# Patient Record
Sex: MALE | Race: BLACK OR AFRICAN AMERICAN | URBAN - METROPOLITAN AREA
[De-identification: names, ages, dates, MRNs, and addresses within clinical notes are randomized per-mention and may not be internally consistent; named-entity substitution may affect disease eponyms.]

---

## 2020-07-07 ENCOUNTER — OFFICE VISIT (OUTPATIENT)
Dept: URBAN - METROPOLITAN AREA CLINIC 113 | Facility: CLINIC | Age: 70
End: 2020-07-07

## 2020-07-25 ENCOUNTER — TELEPHONE ENCOUNTER (OUTPATIENT)
Dept: URBAN - METROPOLITAN AREA CLINIC 13 | Facility: CLINIC | Age: 70
End: 2020-07-25

## 2020-07-25 RX ORDER — RIFAXIMIN 550 MG/1
TAKE 1 TABLET 3 TIMES DAILY TABLET ORAL
Qty: 30 | Refills: 0 | OUTPATIENT
Start: 2014-02-11 | End: 2014-05-09

## 2020-07-25 RX ORDER — POLYETHYLENE GLYCOL 3350, SODIUM CHLORIDE, SODIUM BICARBONATE AND POTASSIUM CHLORIDE WITH LEMON FLAVOR 420; 11.2; 5.72; 1.48 G/4L; G/4L; G/4L; G/4L
USE AS DIRECTED POWDER, FOR SOLUTION ORAL
Qty: 1 | Refills: 0 | OUTPATIENT
Start: 2012-06-01 | End: 2012-07-06

## 2020-07-25 RX ORDER — PREDNISOLONE ACETATE 10 MG/ML
INSTILL 1 DROP INTO BOTH EYES 2 TIMES DAILY SUSPENSION/ DROPS OPHTHALMIC
Refills: 0 | OUTPATIENT
Start: 2020-01-08 | End: 2020-03-24

## 2020-07-25 RX ORDER — OMEPRAZOLE 20 MG/1
TAKE 1 CAPSULE DAILY CAPSULE, DELAYED RELEASE ORAL
Refills: 4 | OUTPATIENT
Start: 2019-07-26 | End: 2020-01-30

## 2020-07-25 RX ORDER — LEVOTHYROXINE SODIUM 137 UG/1
TAKE 1 TABLET DAILY TABLET ORAL
Refills: 0 | OUTPATIENT
Start: 2009-12-17 | End: 2011-03-14

## 2020-07-25 RX ORDER — POLYETHYLENE GLYCOL 3350, SODIUM CHLORIDE, SODIUM BICARBONATE AND POTASSIUM CHLORIDE WITH LEMON FLAVOR 420; 11.2; 5.72; 1.48 G/4L; G/4L; G/4L; G/4L
TAKE 1/2 GALLON AT 5:00 PM DAY BEFORE PROCEDURE, TAKE SECOND 1/2 OF GALLON 6 HRS PRIOR TO PROCEDURE POWDER, FOR SOLUTION ORAL
Qty: 1 | Refills: 0 | OUTPATIENT
Start: 2019-12-16 | End: 2020-01-30

## 2020-07-25 RX ORDER — DEXLANSOPRAZOLE 60 MG/1
TAKE 1 CAPSULE DAILY CAPSULE, DELAYED RELEASE ORAL
Qty: 30 | Refills: 10 | OUTPATIENT
Start: 2020-03-24 | End: 2020-03-27

## 2020-07-25 RX ORDER — FLUTICASONE PROPIONATE 50 UG/1
USE 1 SPRAY IN EACH NOSTRIL ONCE DAILY SPRAY, METERED NASAL
Qty: 16 | Refills: 0 | OUTPATIENT
Start: 2014-02-21 | End: 2014-11-25

## 2020-07-25 RX ORDER — ZOLPIDEM TARTRATE 5 MG/1
TAKE 1 TABLET AT BEDTIME AS NEEDED FOR SLEEP TABLET, FILM COATED ORAL
Qty: 30 | Refills: 0 | OUTPATIENT
Start: 2013-03-05 | End: 2014-06-02

## 2020-07-25 RX ORDER — FAMOTIDINE 40 MG/1
TAKE 1 TABLET AT BEDTIME TABLET ORAL
Refills: 0 | OUTPATIENT
Start: 2019-10-31 | End: 2020-03-24

## 2020-07-25 RX ORDER — CITALOPRAM 20 MG/1
TAKE 1 TABLET DAILY TABLET ORAL
Qty: 30 | Refills: 2 | OUTPATIENT
Start: 2014-05-09 | End: 2014-11-25

## 2020-07-25 RX ORDER — PANTOPRAZOLE SODIUM 40 MG/1
TAKE 1 TABLET DAILY TABLET, DELAYED RELEASE ORAL
Refills: 0 | OUTPATIENT
Start: 2019-10-31 | End: 2020-03-24

## 2020-07-25 RX ORDER — KETOCONAZOLE 20.5 MG/ML
SHAMPOO, SUSPENSION TOPICAL
Qty: 120 | Refills: 0 | OUTPATIENT
Start: 2014-01-20 | End: 2014-05-09

## 2020-07-26 ENCOUNTER — TELEPHONE ENCOUNTER (OUTPATIENT)
Dept: URBAN - METROPOLITAN AREA CLINIC 13 | Facility: CLINIC | Age: 70
End: 2020-07-26

## 2020-07-26 RX ORDER — RABEPRAZOLE SODIUM 20 MG/1
TAKE 1 TABLET TWICE DAILY TABLET, DELAYED RELEASE ORAL
Qty: 60 | Refills: 3 | Status: ACTIVE | COMMUNITY
Start: 2020-03-27

## 2020-07-26 RX ORDER — ZOLPIDEM TARTRATE 10 MG/1
TABLET, FILM COATED ORAL
Qty: 30 | Refills: 0 | Status: ACTIVE | COMMUNITY
Start: 2014-07-23

## 2020-07-26 RX ORDER — NAPROXEN 500 MG/1
TABLET ORAL
Qty: 20 | Refills: 0 | Status: ACTIVE | COMMUNITY
Start: 2012-09-17

## 2020-07-26 RX ORDER — NEOMYCIN SULFATE, POLYMYXIN B SULFATE AND HYDROCORTISONE 10; 3.5; 1 MG/ML; MG/ML; [USP'U]/ML
SUSPENSION/ DROPS AURICULAR (OTIC)
Qty: 7 | Refills: 0 | Status: ACTIVE | COMMUNITY
Start: 2014-09-20

## 2020-07-26 RX ORDER — NAPROXEN 500 MG/1
TABLET ORAL
Qty: 60 | Refills: 0 | Status: ACTIVE | COMMUNITY
Start: 2011-12-30

## 2020-07-26 RX ORDER — SULFAMETHOXAZOLE AND TRIMETHOPRIM 800; 160 MG/1; MG/1
TABLET ORAL
Qty: 10 | Refills: 0 | Status: ACTIVE | COMMUNITY
Start: 2019-04-26

## 2020-07-26 RX ORDER — PANTOPRAZOLE SODIUM 40 MG/1
TABLET, DELAYED RELEASE ORAL
Qty: 90 | Refills: 0 | Status: ACTIVE | COMMUNITY
Start: 2019-07-22

## 2020-07-26 RX ORDER — ERYTHROMYCIN 5 MG/G
OINTMENT OPHTHALMIC
Qty: 3 | Refills: 0 | Status: ACTIVE | COMMUNITY
Start: 2014-09-02

## 2020-07-26 RX ORDER — CARISOPRODOL 350 MG/1
TABLET ORAL
Qty: 30 | Refills: 0 | Status: ACTIVE | COMMUNITY
Start: 2012-09-24

## 2020-07-26 RX ORDER — ZOLPIDEM TARTRATE 5 MG/1
TABLET, FILM COATED ORAL
Qty: 30 | Refills: 0 | Status: ACTIVE | COMMUNITY
Start: 2013-03-05

## 2020-07-26 RX ORDER — MELOXICAM 15 MG/1
TABLET ORAL
Qty: 30 | Refills: 0 | Status: ACTIVE | COMMUNITY
Start: 2012-03-27

## 2020-07-26 RX ORDER — FAMOTIDINE 40 MG/1
TABLET ORAL
Qty: 30 | Refills: 0 | Status: ACTIVE | COMMUNITY
Start: 2019-10-31

## 2020-07-26 RX ORDER — TOBRAMYCIN 3 MG/ML
SOLUTION/ DROPS OPHTHALMIC
Qty: 5 | Refills: 0 | Status: ACTIVE | COMMUNITY
Start: 2013-07-29

## 2020-07-26 RX ORDER — ONDANSETRON HYDROCHLORIDE 4 MG/1
TABLET, FILM COATED ORAL
Qty: 15 | Refills: 0 | Status: ACTIVE | COMMUNITY
Start: 2020-06-22

## 2020-07-26 RX ORDER — CITALOPRAM 20 MG/1
TABLET ORAL
Qty: 15 | Refills: 0 | Status: ACTIVE | COMMUNITY
Start: 2012-03-05

## 2020-07-26 RX ORDER — ASPIRIN 81 MG
TABLET, DELAYED RELEASE (ENTERIC COATED) ORAL
Qty: 30 | Refills: 0 | Status: ACTIVE | COMMUNITY
Start: 2014-02-14

## 2020-07-26 RX ORDER — LEVOTHYROXINE SODIUM 125 UG/1
TAKE 1 TABLET DAILY TABLET ORAL
Refills: 0 | Status: ACTIVE | COMMUNITY
Start: 2011-03-14

## 2020-07-26 RX ORDER — ECONAZOLE NITRATE 10 MG/G
APPLY AND GENTLY MASSAGE INTO AFFECTED AREA(S) TWICE DAILY CREAM TOPICAL
Refills: 0 | Status: ACTIVE | COMMUNITY
Start: 2014-01-20

## 2020-07-26 RX ORDER — SILDENAFIL 100 MG/1
TAKE 1 TABLET DAILY 1 HOUR BEFORE NEEDED TABLET, FILM COATED ORAL
Refills: 0 | Status: ACTIVE | COMMUNITY
Start: 2019-07-15

## 2020-07-26 RX ORDER — BENZONATATE 100 MG/1
CAPSULE ORAL
Qty: 30 | Refills: 0 | Status: ACTIVE | COMMUNITY
Start: 2011-12-05

## 2020-07-26 RX ORDER — SODIUM SULFACETAMIDE 100 MG/ML
LOTION TOPICAL
Qty: 118 | Refills: 0 | Status: ACTIVE | COMMUNITY
Start: 2019-05-24

## 2020-07-26 RX ORDER — DICLOFENAC SODIUM 75 MG/1
TABLET, DELAYED RELEASE ORAL
Qty: 60 | Refills: 0 | Status: ACTIVE | COMMUNITY
Start: 2011-04-25

## 2020-07-26 RX ORDER — PREDNISONE 5 MG/1
TABLET ORAL
Qty: 42 | Refills: 0 | Status: ACTIVE | COMMUNITY
Start: 2011-05-18

## 2020-07-26 RX ORDER — SERTRALINE HYDROCHLORIDE 112 UG/1
TABLET ORAL
Qty: 90 | Refills: 0 | Status: ACTIVE | COMMUNITY
Start: 2018-09-17

## 2020-07-26 RX ORDER — METHYLPREDNISOLONE 4 MG/1
TAKE BY MOUTH AS DIRECTED ON INSIDE OF PACKAGE TABLET ORAL
Qty: 21 | Refills: 0 | Status: ACTIVE | COMMUNITY
Start: 2020-05-12

## 2020-07-26 RX ORDER — DICLOFENAC SODIUM 10 MG/G
GEL TOPICAL
Qty: 100 | Refills: 0 | Status: ACTIVE | COMMUNITY
Start: 2019-11-07

## 2020-07-26 RX ORDER — FLUOCINOLONE ACETONIDE OIL 0.11 MG/ML
OIL AURICULAR (OTIC)
Qty: 20 | Refills: 0 | Status: ACTIVE | COMMUNITY
Start: 2014-09-04

## 2020-07-26 RX ORDER — CARISOPRODOL 350 MG/1
TABLET ORAL
Qty: 30 | Refills: 0 | Status: ACTIVE | COMMUNITY
Start: 2019-03-13

## 2020-07-26 RX ORDER — ZOLPIDEM TARTRATE 10 MG/1
TABLET, FILM COATED ORAL
Qty: 30 | Refills: 0 | Status: ACTIVE | COMMUNITY
Start: 2014-05-02

## 2020-07-26 RX ORDER — TESTOSTERONE 16.2 MG/G
GEL TRANSDERMAL
Qty: 75 | Refills: 0 | Status: ACTIVE | COMMUNITY
Start: 2013-08-20

## 2020-07-26 RX ORDER — CLOBETASOL PROPIONATE 0.5 MG/G
AEROSOL, FOAM TOPICAL
Qty: 50 | Refills: 0 | Status: ACTIVE | COMMUNITY
Start: 2014-05-28

## 2020-07-26 RX ORDER — ZALEPLON 10 MG/1
CAPSULE ORAL
Qty: 45 | Refills: 0 | Status: ACTIVE | COMMUNITY
Start: 2012-03-05

## 2020-07-26 RX ORDER — AMOXICILLIN 500 MG/1
CAPSULE ORAL
Qty: 30 | Refills: 0 | Status: ACTIVE | COMMUNITY
Start: 2020-05-12

## 2020-07-26 RX ORDER — HYDROXYZINE PAMOATE 25 MG/1
CAPSULE ORAL
Qty: 30 | Refills: 0 | Status: ACTIVE | COMMUNITY
Start: 2013-12-05

## 2020-07-26 RX ORDER — TRIAMCINOLONE ACETONIDE 0.25 MG/ML
LOTION TOPICAL
Qty: 60 | Refills: 0 | Status: ACTIVE | COMMUNITY
Start: 2014-09-04

## 2020-07-26 RX ORDER — BENZONATATE 100 MG/1
CAPSULE ORAL
Qty: 30 | Refills: 0 | Status: ACTIVE | COMMUNITY
Start: 2018-12-10

## 2020-07-26 RX ORDER — PANTOPRAZOLE SODIUM 40 MG/1
TABLET, DELAYED RELEASE ORAL
Qty: 30 | Refills: 0 | Status: ACTIVE | COMMUNITY
Start: 2019-10-31

## 2020-07-26 RX ORDER — HYDROCODONE BITARTRATE AND ACETAMINOPHEN 7.5; 325 MG/1; MG/1
TABLET ORAL
Qty: 25 | Refills: 0 | Status: ACTIVE | COMMUNITY
Start: 2020-06-22

## 2020-07-26 RX ORDER — CELECOXIB 200 MG/1
CAPSULE ORAL
Qty: 30 | Refills: 0 | Status: ACTIVE | COMMUNITY
Start: 2013-06-13

## 2020-07-26 RX ORDER — PREDNISOLONE ACETATE 10 MG/ML
SUSPENSION/ DROPS OPHTHALMIC
Qty: 10 | Refills: 0 | Status: ACTIVE | COMMUNITY
Start: 2020-01-08

## 2020-07-26 RX ORDER — CYCLOBENZAPRINE HYDROCHLORIDE 10 MG/1
TABLET, FILM COATED ORAL
Qty: 60 | Refills: 0 | Status: ACTIVE | COMMUNITY
Start: 2019-01-28

## 2020-07-26 RX ORDER — DICLOFENAC SODIUM 1 %
GEL (GRAM) TOPICAL
Qty: 255 | Refills: 0 | Status: ACTIVE | COMMUNITY
Start: 2011-05-18

## 2020-07-26 RX ORDER — AZITHROMYCIN DIHYDRATE 250 MG/1
TAKE 2 TABLETS BY MOUTH TODAY, THEN TAKE 1 TABLET DAILY FOR 4 DAYS TABLET, FILM COATED ORAL
Qty: 6 | Refills: 0 | Status: ACTIVE | COMMUNITY
Start: 2012-01-29

## 2020-07-26 RX ORDER — BENZONATATE 100 MG/1
CAPSULE ORAL
Qty: 30 | Refills: 0 | Status: ACTIVE | COMMUNITY
Start: 2014-06-02

## 2020-07-26 RX ORDER — DICLOFENAC SODIUM 75 MG/1
TABLET, DELAYED RELEASE ORAL
Qty: 60 | Refills: 0 | Status: ACTIVE | COMMUNITY
Start: 2019-03-13

## 2020-07-26 RX ORDER — HYDROCODONE BITARTRATE AND ACETAMINOPHEN 7.5; 325 MG/1; MG/1
TABLET ORAL
Qty: 50 | Refills: 0 | Status: ACTIVE | COMMUNITY
Start: 2013-06-13

## 2020-07-26 RX ORDER — PREDNISOLONE ACETATE 10 MG/ML
SUSPENSION/ DROPS OPHTHALMIC
Qty: 5 | Refills: 0 | Status: ACTIVE | COMMUNITY
Start: 2018-10-26

## 2020-07-26 RX ORDER — METRONIDAZOLE 500 MG/1
TABLET ORAL
Qty: 30 | Refills: 0 | Status: ACTIVE | COMMUNITY
Start: 2013-12-26

## 2020-07-26 RX ORDER — CIPROFLOXACIN HYDROCHLORIDE 500 MG/1
TAKE 1 TABLET BY MOUTH EVERY 12 HOURS TABLET, FILM COATED ORAL
Qty: 14 | Refills: 0 | Status: ACTIVE | COMMUNITY
Start: 2020-04-16

## 2020-07-26 RX ORDER — MELOXICAM 15 MG/1
TABLET ORAL
Qty: 30 | Refills: 0 | Status: ACTIVE | COMMUNITY
Start: 2013-11-12

## 2020-07-26 RX ORDER — ERYTHROMYCIN 5 MG/G
OINTMENT OPHTHALMIC
Qty: 3 | Refills: 0 | Status: ACTIVE | COMMUNITY
Start: 2018-09-27

## 2020-07-26 RX ORDER — CEFDINIR 300 MG/1
CAPSULE ORAL
Qty: 20 | Refills: 0 | Status: ACTIVE | COMMUNITY
Start: 2011-11-22

## 2020-07-26 RX ORDER — MELOXICAM 15 MG/1
TABLET ORAL
Qty: 30 | Refills: 0 | Status: ACTIVE | COMMUNITY
Start: 2015-02-18

## 2020-07-26 RX ORDER — TOBRAMYCIN AND DEXAMETHASONE 3; 1 MG/ML; MG/ML
SUSPENSION/ DROPS OPHTHALMIC
Qty: 5 | Refills: 0 | Status: ACTIVE | COMMUNITY
Start: 2014-09-04

## 2020-07-26 RX ORDER — TRAZODONE HYDROCHLORIDE 50 MG/1
TAKE 1 TABLET DAILY TABLET ORAL
Qty: 30 | Refills: 1 | Status: ACTIVE | COMMUNITY
Start: 2018-09-11

## 2020-07-26 RX ORDER — ZOLPIDEM TARTRATE 10 MG/1
TABLET, FILM COATED ORAL
Qty: 30 | Refills: 0 | Status: ACTIVE | COMMUNITY
Start: 2012-02-23

## 2020-07-26 RX ORDER — DICLOFENAC SODIUM 10 MG/G
APPLY 2 GRAMS TO AFFECTED AREA(S) 4 TIMES A DAY GEL TOPICAL
Qty: 300 | Refills: 0 | Status: ACTIVE | COMMUNITY
Start: 2020-01-24

## 2020-07-26 RX ORDER — TRAZODONE HYDROCHLORIDE 50 MG/1
TABLET ORAL
Qty: 1 | Refills: 0 | Status: ACTIVE | COMMUNITY
Start: 2019-03-19

## 2020-07-26 RX ORDER — LEVOCETIRIZINE DIHYDROCHLORIDE 5 MG/1
TABLET ORAL
Qty: 30 | Refills: 0 | Status: ACTIVE | COMMUNITY
Start: 2013-12-05

## 2020-07-26 RX ORDER — CIPROFLOXACIN HYDROCHLORIDE 500 MG/1
TABLET, FILM COATED ORAL
Qty: 20 | Refills: 0 | Status: ACTIVE | COMMUNITY
Start: 2013-12-26

## 2020-07-26 RX ORDER — PIROXICAM 20 MG/1
CAPSULE ORAL
Qty: 30 | Refills: 0 | Status: ACTIVE | COMMUNITY
Start: 2014-04-18

## 2020-07-26 RX ORDER — BROMPHENIRAMINE MALEATE, PSEUDOEPHEDRINE HYDROCHLORIDE, 2; 30; 10 MG/5ML; MG/5ML; MG/5ML
SYRUP ORAL
Qty: 120 | Refills: 0 | Status: ACTIVE | COMMUNITY
Start: 2014-12-30

## 2020-07-26 RX ORDER — CLOBETASOL PROPIONATE 0.5 MG/G
AEROSOL, FOAM TOPICAL
Qty: 50 | Refills: 0 | Status: ACTIVE | COMMUNITY
Start: 2014-06-03

## 2020-07-26 RX ORDER — PENICILLIN V POTASSIUM 500 MG/1
TABLET, FILM COATED ORAL
Qty: 20 | Refills: 0 | Status: ACTIVE | COMMUNITY
Start: 2014-12-24

## 2020-07-26 RX ORDER — DOXYCYCLINE HYCLATE 100 MG/1
TABLET ORAL
Qty: 7 | Refills: 0 | Status: ACTIVE | COMMUNITY
Start: 2013-08-08

## 2020-07-26 RX ORDER — TACROLIMUS 1 MG/G
OINTMENT TOPICAL
Qty: 60 | Refills: 0 | Status: ACTIVE | COMMUNITY
Start: 2011-10-31

## 2020-07-26 RX ORDER — LIDOCAINE 50 MG/G
APPLY 1 PATCH FOR 12 HOURS DAILY PATCH TOPICAL
Qty: 30 | Refills: 0 | Status: ACTIVE | COMMUNITY
Start: 2019-06-03

## 2020-07-26 RX ORDER — ESZOPICLONE 3 MG/1
TAKE 1 TABLET AT BEDTIME AS NEEDED FOR SLEEP TABLET, COATED ORAL
Qty: 30 | Refills: 0 | Status: ACTIVE | COMMUNITY
Start: 2013-06-20

## 2020-07-26 RX ORDER — ERYTHROMYCIN 5 MG/G
OINTMENT OPHTHALMIC
Qty: 3 | Refills: 0 | Status: ACTIVE | COMMUNITY
Start: 2013-07-15

## 2020-07-26 RX ORDER — CEPHALEXIN 500 MG/1
CAPSULE ORAL
Qty: 28 | Refills: 0 | Status: ACTIVE | COMMUNITY
Start: 2012-02-10

## 2020-07-26 RX ORDER — PANTOPRAZOLE SODIUM 40 MG/1
TABLET, DELAYED RELEASE ORAL
Qty: 30 | Refills: 0 | Status: ACTIVE | COMMUNITY
Start: 2013-12-26

## 2020-07-26 RX ORDER — TRIAMCINOLONE ACETONIDE 0.25 MG/ML
LOTION TOPICAL
Qty: 60 | Refills: 0 | Status: ACTIVE | COMMUNITY
Start: 2014-09-09

## 2020-07-26 RX ORDER — MELOXICAM 7.5 MG/1
TABLET ORAL
Qty: 60 | Refills: 0 | Status: ACTIVE | COMMUNITY
Start: 2018-09-05

## 2020-07-26 RX ORDER — ATORVASTATIN CALCIUM 20 MG/1
TAKE 1 TABLET DAILY TABLET, FILM COATED ORAL
Refills: 0 | Status: ACTIVE | COMMUNITY
Start: 2018-08-22

## 2020-07-26 RX ORDER — COLCHICINE 0.6 MG/1
TABLET, FILM COATED ORAL
Qty: 60 | Refills: 0 | Status: ACTIVE | COMMUNITY
Start: 2013-11-19

## 2020-07-26 RX ORDER — TOBRAMYCIN AND DEXAMETHASONE 3; 1 MG/G; MG/G
OINTMENT OPHTHALMIC
Qty: 3 | Refills: 0 | Status: ACTIVE | COMMUNITY
Start: 2019-12-03

## 2020-07-26 RX ORDER — TRAMADOL HYDROCHLORIDE 50 MG/1
TABLET ORAL
Qty: 21 | Refills: 0 | Status: ACTIVE | COMMUNITY
Start: 2019-03-13

## 2020-07-26 RX ORDER — AMOXICILLIN AND CLAVULANATE POTASSIUM 875; 125 MG/1; MG/1
TABLET, FILM COATED ORAL
Qty: 14 | Refills: 0 | Status: ACTIVE | COMMUNITY
Start: 2019-12-05

## 2020-07-26 RX ORDER — NAFTIFINE HYDROCHLORIDE 10 MG/G
GEL TOPICAL
Qty: 90 | Refills: 0 | Status: ACTIVE | COMMUNITY
Start: 2012-04-04

## 2020-07-26 RX ORDER — FAMOTIDINE 20 MG/1
TABLET ORAL
Qty: 60 | Refills: 0 | Status: ACTIVE | COMMUNITY
Start: 2014-02-14

## 2020-07-26 RX ORDER — CLOBETASOL PROPIONATE 0.05 G/100ML
SHAMPOO TOPICAL
Qty: 118 | Refills: 0 | Status: ACTIVE | COMMUNITY
Start: 2012-04-04

## 2020-07-26 RX ORDER — LIDOCAINE 50 MG/G
PATCH TOPICAL
Qty: 30 | Refills: 0 | Status: ACTIVE | COMMUNITY
Start: 2018-09-11

## 2020-07-26 RX ORDER — ZOLPIDEM TARTRATE 10 MG/1
TABLET, FILM COATED ORAL
Qty: 30 | Refills: 0 | Status: ACTIVE | COMMUNITY
Start: 2011-03-04

## 2020-12-03 ENCOUNTER — OFFICE VISIT (OUTPATIENT)
Dept: URBAN - METROPOLITAN AREA CLINIC 113 | Facility: CLINIC | Age: 70
End: 2020-12-03
Payer: MEDICARE

## 2020-12-03 ENCOUNTER — WEB ENCOUNTER (OUTPATIENT)
Dept: URBAN - METROPOLITAN AREA CLINIC 113 | Facility: CLINIC | Age: 70
End: 2020-12-03

## 2020-12-03 VITALS
BODY MASS INDEX: 27.89 KG/M2 | TEMPERATURE: 98 F | SYSTOLIC BLOOD PRESSURE: 113 MMHG | HEART RATE: 58 BPM | HEIGHT: 68 IN | WEIGHT: 184 LBS | DIASTOLIC BLOOD PRESSURE: 72 MMHG

## 2020-12-03 DIAGNOSIS — R14.2 ERUCTATION: ICD-10-CM

## 2020-12-03 DIAGNOSIS — K21.9 GASTROESOPHAGEAL REFLUX DISEASE WITHOUT ESOPHAGITIS: ICD-10-CM

## 2020-12-03 DIAGNOSIS — R14.0 BLOATING: ICD-10-CM

## 2020-12-03 PROCEDURE — G8427 DOCREV CUR MEDS BY ELIG CLIN: HCPCS | Performed by: INTERNAL MEDICINE

## 2020-12-03 PROCEDURE — 99213 OFFICE O/P EST LOW 20 MIN: CPT | Performed by: INTERNAL MEDICINE

## 2020-12-03 RX ORDER — PIROXICAM 20 MG/1
CAPSULE ORAL
Qty: 30 | Refills: 0 | Status: ON HOLD | COMMUNITY
Start: 2014-04-18

## 2020-12-03 RX ORDER — CELECOXIB 200 MG/1
CAPSULE ORAL
Qty: 30 | Refills: 0 | Status: ON HOLD | COMMUNITY
Start: 2013-06-13

## 2020-12-03 RX ORDER — METHYLPREDNISOLONE 4 MG/1
TAKE BY MOUTH AS DIRECTED ON INSIDE OF PACKAGE TABLET ORAL
Qty: 21 | Refills: 0 | Status: ON HOLD | COMMUNITY
Start: 2020-05-12

## 2020-12-03 RX ORDER — AZITHROMYCIN DIHYDRATE 250 MG/1
TAKE 2 TABLETS BY MOUTH TODAY, THEN TAKE 1 TABLET DAILY FOR 4 DAYS TABLET, FILM COATED ORAL
Qty: 6 | Refills: 0 | Status: ON HOLD | COMMUNITY
Start: 2012-01-29

## 2020-12-03 RX ORDER — DOXYCYCLINE HYCLATE 100 MG/1
TABLET ORAL
Qty: 7 | Refills: 0 | Status: ON HOLD | COMMUNITY
Start: 2013-08-08

## 2020-12-03 RX ORDER — ESOMEPRAZOLE SODIUM 40 MG/5ML
AS DIRECTED INJECTION INTRAVENOUS
Status: DISCONTINUED | COMMUNITY

## 2020-12-03 RX ORDER — TOBRAMYCIN AND DEXAMETHASONE 3; 1 MG/G; MG/G
OINTMENT OPHTHALMIC
Qty: 3 | Refills: 0 | Status: ON HOLD | COMMUNITY
Start: 2019-12-03

## 2020-12-03 RX ORDER — DICLOFENAC SODIUM 1 %
GEL (GRAM) TOPICAL
Qty: 255 | Refills: 0 | Status: ON HOLD | COMMUNITY
Start: 2011-05-18

## 2020-12-03 RX ORDER — MELOXICAM 7.5 MG/1
TABLET ORAL
Qty: 60 | Refills: 0 | Status: ON HOLD | COMMUNITY
Start: 2018-09-05

## 2020-12-03 RX ORDER — ZALEPLON 10 MG/1
CAPSULE ORAL
Qty: 45 | Refills: 0 | Status: ON HOLD | COMMUNITY
Start: 2012-03-05

## 2020-12-03 RX ORDER — SULFAMETHOXAZOLE AND TRIMETHOPRIM 800; 160 MG/1; MG/1
TABLET ORAL
Qty: 10 | Refills: 0 | Status: ON HOLD | COMMUNITY
Start: 2019-04-26

## 2020-12-03 RX ORDER — METRONIDAZOLE 500 MG/1
TABLET ORAL
Qty: 30 | Refills: 0 | Status: ON HOLD | COMMUNITY
Start: 2013-12-26

## 2020-12-03 RX ORDER — CARISOPRODOL 350 MG/1
TABLET ORAL
Qty: 30 | Refills: 0 | Status: ON HOLD | COMMUNITY
Start: 2012-09-24

## 2020-12-03 RX ORDER — LIDOCAINE 50 MG/G
PATCH TOPICAL
Qty: 30 | Refills: 0 | Status: ACTIVE | COMMUNITY
Start: 2018-09-11

## 2020-12-03 RX ORDER — TESTOSTERONE 16.2 MG/G
GEL TRANSDERMAL
Qty: 75 | Refills: 0 | Status: ON HOLD | COMMUNITY
Start: 2013-08-20

## 2020-12-03 RX ORDER — CLOBETASOL PROPIONATE 0.05 G/100ML
SHAMPOO TOPICAL
Qty: 118 | Refills: 0 | Status: ON HOLD | COMMUNITY
Start: 2012-04-04

## 2020-12-03 RX ORDER — NEOMYCIN SULFATE, POLYMYXIN B SULFATE AND HYDROCORTISONE 10; 3.5; 1 MG/ML; MG/ML; [USP'U]/ML
SUSPENSION/ DROPS AURICULAR (OTIC)
Qty: 7 | Refills: 0 | Status: ON HOLD | COMMUNITY
Start: 2014-09-20

## 2020-12-03 RX ORDER — SILDENAFIL 100 MG/1
TAKE 1 TABLET DAILY 1 HOUR BEFORE NEEDED TABLET, FILM COATED ORAL
Refills: 0 | Status: ACTIVE | COMMUNITY
Start: 2019-07-15

## 2020-12-03 RX ORDER — RABEPRAZOLE SODIUM 20 MG/1
TAKE 1 TABLET TWICE DAILY TABLET, DELAYED RELEASE ORAL
Qty: 60 | Refills: 3 | Status: ON HOLD | COMMUNITY
Start: 2020-03-27

## 2020-12-03 RX ORDER — FLUOCINOLONE ACETONIDE OIL 0.11 MG/ML
OIL AURICULAR (OTIC)
Qty: 20 | Refills: 0 | Status: ON HOLD | COMMUNITY
Start: 2014-09-04

## 2020-12-03 RX ORDER — NAPROXEN 500 MG/1
TABLET ORAL
Qty: 60 | Refills: 0 | Status: ON HOLD | COMMUNITY
Start: 2011-12-30

## 2020-12-03 RX ORDER — TRIAMCINOLONE ACETONIDE 0.25 MG/ML
LOTION TOPICAL
Qty: 60 | Refills: 0 | Status: ON HOLD | COMMUNITY
Start: 2014-09-04

## 2020-12-03 RX ORDER — AMITRIPTYLINE HYDROCHLORIDE 25 MG/1
1 TABLET AT BEDTIME TABLET, FILM COATED ORAL ONCE A DAY
Qty: 30 TABLET | Refills: 4 | OUTPATIENT

## 2020-12-03 RX ORDER — CYCLOBENZAPRINE HYDROCHLORIDE 10 MG/1
TABLET, FILM COATED ORAL
Qty: 60 | Refills: 0 | Status: ON HOLD | COMMUNITY
Start: 2019-01-28

## 2020-12-03 RX ORDER — CIPROFLOXACIN HYDROCHLORIDE 500 MG/1
TABLET, FILM COATED ORAL
Qty: 20 | Refills: 0 | Status: ON HOLD | COMMUNITY
Start: 2013-12-26

## 2020-12-03 RX ORDER — AMOXICILLIN AND CLAVULANATE POTASSIUM 875; 125 MG/1; MG/1
TABLET, FILM COATED ORAL
Qty: 14 | Refills: 0 | Status: ON HOLD | COMMUNITY
Start: 2019-12-05

## 2020-12-03 RX ORDER — AMOXICILLIN 500 MG/1
CAPSULE ORAL
Qty: 30 | Refills: 0 | Status: ON HOLD | COMMUNITY
Start: 2020-05-12

## 2020-12-03 RX ORDER — FAMOTIDINE 40 MG/1
TABLET ORAL
Qty: 30 | Refills: 0 | Status: ACTIVE | COMMUNITY
Start: 2019-10-31

## 2020-12-03 RX ORDER — ASPIRIN 81 MG
TABLET, DELAYED RELEASE (ENTERIC COATED) ORAL
Qty: 30 | Refills: 0 | Status: ON HOLD | COMMUNITY
Start: 2014-02-14

## 2020-12-03 RX ORDER — PANTOPRAZOLE SODIUM 40 MG/1
TABLET, DELAYED RELEASE ORAL
Qty: 30 | Refills: 0 | Status: ON HOLD | COMMUNITY
Start: 2013-12-26

## 2020-12-03 RX ORDER — PENICILLIN V POTASSIUM 500 MG/1
TABLET, FILM COATED ORAL
Qty: 20 | Refills: 0 | Status: ON HOLD | COMMUNITY
Start: 2014-12-24

## 2020-12-03 RX ORDER — MELOXICAM 15 MG/1
TABLET ORAL
Qty: 30 | Refills: 0 | Status: ON HOLD | COMMUNITY
Start: 2012-03-27

## 2020-12-03 RX ORDER — SODIUM SULFACETAMIDE 100 MG/ML
LOTION TOPICAL
Qty: 118 | Refills: 0 | Status: ON HOLD | COMMUNITY
Start: 2019-05-24

## 2020-12-03 RX ORDER — TOBRAMYCIN AND DEXAMETHASONE 3; 1 MG/ML; MG/ML
SUSPENSION/ DROPS OPHTHALMIC
Qty: 5 | Refills: 0 | Status: ON HOLD | COMMUNITY
Start: 2014-09-04

## 2020-12-03 RX ORDER — BROMPHENIRAMINE MALEATE, PSEUDOEPHEDRINE HYDROCHLORIDE, 2; 30; 10 MG/5ML; MG/5ML; MG/5ML
SYRUP ORAL
Qty: 120 | Refills: 0 | Status: ON HOLD | COMMUNITY
Start: 2014-12-30

## 2020-12-03 RX ORDER — ERYTHROMYCIN 5 MG/G
OINTMENT OPHTHALMIC
Qty: 3 | Refills: 0 | Status: ON HOLD | COMMUNITY
Start: 2013-07-15

## 2020-12-03 RX ORDER — ESZOPICLONE 3 MG/1
TAKE 1 TABLET AT BEDTIME AS NEEDED FOR SLEEP TABLET, COATED ORAL
Qty: 30 | Refills: 0 | Status: ON HOLD | COMMUNITY
Start: 2013-06-20

## 2020-12-03 RX ORDER — NAFTIFINE HYDROCHLORIDE 10 MG/G
GEL TOPICAL
Qty: 90 | Refills: 0 | Status: ON HOLD | COMMUNITY
Start: 2012-04-04

## 2020-12-03 RX ORDER — TRAZODONE HYDROCHLORIDE 50 MG/1
TAKE 1 TABLET DAILY TABLET ORAL
Qty: 30 | Refills: 1 | Status: ACTIVE | COMMUNITY
Start: 2018-09-11

## 2020-12-03 RX ORDER — CITALOPRAM 20 MG/1
TABLET ORAL
Qty: 15 | Refills: 0 | Status: ON HOLD | COMMUNITY
Start: 2012-03-05

## 2020-12-03 RX ORDER — DICLOFENAC SODIUM 75 MG/1
TABLET, DELAYED RELEASE ORAL
Qty: 60 | Refills: 0 | Status: ON HOLD | COMMUNITY
Start: 2011-04-25

## 2020-12-03 RX ORDER — LEVOTHYROXINE SODIUM 125 UG/1
TAKE 1 TABLET DAILY TABLET ORAL
Refills: 0 | Status: ACTIVE | COMMUNITY
Start: 2011-03-14

## 2020-12-03 RX ORDER — ATORVASTATIN CALCIUM 20 MG/1
TAKE 1 TABLET DAILY TABLET, FILM COATED ORAL
Refills: 0 | Status: ON HOLD | COMMUNITY
Start: 2018-08-22

## 2020-12-03 RX ORDER — CEFDINIR 300 MG/1
CAPSULE ORAL
Qty: 20 | Refills: 0 | Status: ON HOLD | COMMUNITY
Start: 2011-11-22

## 2020-12-03 RX ORDER — COLCHICINE 0.6 MG/1
TABLET, FILM COATED ORAL
Qty: 60 | Refills: 0 | Status: ON HOLD | COMMUNITY
Start: 2013-11-19

## 2020-12-03 RX ORDER — ROSUVASTATIN CALCIUM 10 MG/1
1 TABLET TABLET, FILM COATED ORAL ONCE A DAY
Status: ACTIVE | COMMUNITY

## 2020-12-03 RX ORDER — BENZONATATE 100 MG/1
CAPSULE ORAL
Qty: 30 | Refills: 0 | Status: ON HOLD | COMMUNITY
Start: 2011-12-05

## 2020-12-03 RX ORDER — FAMOTIDINE 20 MG/1
TABLET ORAL
Qty: 60 | Refills: 0 | Status: ON HOLD | COMMUNITY
Start: 2014-02-14

## 2020-12-03 RX ORDER — LEVOCETIRIZINE DIHYDROCHLORIDE 5 MG/1
TABLET ORAL
Qty: 30 | Refills: 0 | Status: ON HOLD | COMMUNITY
Start: 2013-12-05

## 2020-12-03 RX ORDER — CLOBETASOL PROPIONATE 0.5 MG/G
AEROSOL, FOAM TOPICAL
Qty: 50 | Refills: 0 | Status: ON HOLD | COMMUNITY
Start: 2014-05-28

## 2020-12-03 RX ORDER — PREDNISONE 5 MG/1
TABLET ORAL
Qty: 42 | Refills: 0 | Status: ON HOLD | COMMUNITY
Start: 2011-05-18

## 2020-12-03 RX ORDER — SERTRALINE HYDROCHLORIDE 112 UG/1
TABLET ORAL
Qty: 90 | Refills: 0 | Status: ACTIVE | COMMUNITY
Start: 2018-09-17

## 2020-12-03 RX ORDER — ZOLPIDEM TARTRATE 5 MG/1
TABLET, FILM COATED ORAL
Qty: 30 | Refills: 0 | Status: ON HOLD | COMMUNITY
Start: 2013-03-05

## 2020-12-03 RX ORDER — PREDNISOLONE ACETATE 10 MG/ML
SUSPENSION/ DROPS OPHTHALMIC
Qty: 5 | Refills: 0 | Status: ON HOLD | COMMUNITY
Start: 2018-10-26

## 2020-12-03 RX ORDER — ONDANSETRON HYDROCHLORIDE 4 MG/1
TABLET, FILM COATED ORAL
Qty: 15 | Refills: 0 | Status: ON HOLD | COMMUNITY
Start: 2020-06-22

## 2020-12-03 RX ORDER — HYDROXYZINE PAMOATE 25 MG/1
CAPSULE ORAL
Qty: 30 | Refills: 0 | Status: ON HOLD | COMMUNITY
Start: 2013-12-05

## 2020-12-03 RX ORDER — TRAMADOL HYDROCHLORIDE 50 MG/1
TABLET ORAL
Qty: 21 | Refills: 0 | Status: ACTIVE | COMMUNITY
Start: 2019-03-13

## 2020-12-03 RX ORDER — ZOLPIDEM TARTRATE 10 MG/1
TABLET, FILM COATED ORAL
Qty: 30 | Refills: 0 | Status: ON HOLD | COMMUNITY
Start: 2011-03-04

## 2020-12-03 RX ORDER — HYDROCODONE BITARTRATE AND ACETAMINOPHEN 7.5; 325 MG/1; MG/1
TABLET ORAL
Qty: 50 | Refills: 0 | Status: ON HOLD | COMMUNITY
Start: 2013-06-13

## 2020-12-03 RX ORDER — CEPHALEXIN 500 MG/1
CAPSULE ORAL
Qty: 28 | Refills: 0 | Status: ON HOLD | COMMUNITY
Start: 2012-02-10

## 2020-12-03 RX ORDER — DICLOFENAC SODIUM 10 MG/G
GEL TOPICAL
Qty: 100 | Refills: 0 | Status: ON HOLD | COMMUNITY
Start: 2019-11-07

## 2020-12-03 RX ORDER — TOBRAMYCIN 3 MG/ML
SOLUTION/ DROPS OPHTHALMIC
Qty: 5 | Refills: 0 | Status: ON HOLD | COMMUNITY
Start: 2013-07-29

## 2020-12-03 RX ORDER — ESOMEPRAZOLE MAGNESIUM 40 MG/1
1 CAPSULE CAPSULE, DELAYED RELEASE ORAL ONCE A DAY
Status: ACTIVE | COMMUNITY

## 2020-12-03 RX ORDER — ECONAZOLE NITRATE 10 MG/G
APPLY AND GENTLY MASSAGE INTO AFFECTED AREA(S) TWICE DAILY CREAM TOPICAL
Refills: 0 | Status: ON HOLD | COMMUNITY
Start: 2014-01-20

## 2020-12-03 RX ORDER — TACROLIMUS 1 MG/G
OINTMENT TOPICAL
Qty: 60 | Refills: 0 | Status: ON HOLD | COMMUNITY
Start: 2011-10-31

## 2020-12-03 NOTE — EXAM-PHYSICAL EXAM
He is alert and oriented to person place and situation in no acute distress.  Abdomen is soft nondistended and nontender without masses or organomegaly.

## 2020-12-03 NOTE — HPI-TODAY'S VISIT:
The patient is a very pleasant 71-year-old gentleman who I followed in the past for acid reflux and colonoscopies for screening purposes.  His last colonoscopy was in January 2020.  This showed prostate enlargement but otherwise unremarkable exam.  His last upper endoscopy was in March 2011.  This revealed a medium sized hiatal hernia.  When I last saw the patient he was referred back to to the question of whether his chronic cough was related to acid reflux.  He was on a proton pump inhibitor at that time.  We gave him a trial of Dexilant at that time.  He had a CT scan of the abdomen and pelvis in April.  This was for hematuria.  This showed an enlarged prostate.  He had a hemangioma of the right hepatic lobe at that time. The patient comes in with a new problem today.  He states over the past few weeks he has developed belching and bloating.  He describes this is coming on when he wakes in the morning and continues till he goes to bed at night.  He has no diarrhea or constipation associated with this.  Proton pump inhibitors do not help.  He is made an emergency room visit because of it.  He states his primary care physician repeated his CT scan a few weeks ago due to the symptoms.  We do not have a copy of that result but according to the patient it was unremarkable.  He does feel that he is under a great deal of stress at this time.  His mother who is 91 years old recently moved in with him due to failing health issues.  He states the cough that I was seeing him for last year has largely resolved and has not an issue.  He does not have any heartburn issues.  He does have a great deal of poor sleep.

## 2021-01-27 ENCOUNTER — WEB ENCOUNTER (OUTPATIENT)
Dept: URBAN - METROPOLITAN AREA CLINIC 113 | Facility: CLINIC | Age: 71
End: 2021-01-27

## 2021-01-27 ENCOUNTER — OFFICE VISIT (OUTPATIENT)
Dept: URBAN - METROPOLITAN AREA CLINIC 113 | Facility: CLINIC | Age: 71
End: 2021-01-27
Payer: MEDICARE

## 2021-01-27 ENCOUNTER — LAB OUTSIDE AN ENCOUNTER (OUTPATIENT)
Dept: URBAN - METROPOLITAN AREA CLINIC 113 | Facility: CLINIC | Age: 71
End: 2021-01-27

## 2021-01-27 VITALS
TEMPERATURE: 97.1 F | WEIGHT: 182 LBS | HEART RATE: 53 BPM | HEIGHT: 68 IN | BODY MASS INDEX: 27.58 KG/M2 | DIASTOLIC BLOOD PRESSURE: 69 MMHG | SYSTOLIC BLOOD PRESSURE: 126 MMHG

## 2021-01-27 DIAGNOSIS — K21.9 GASTROESOPHAGEAL REFLUX DISEASE WITHOUT ESOPHAGITIS: ICD-10-CM

## 2021-01-27 DIAGNOSIS — R14.2 ERUCTATION: ICD-10-CM

## 2021-01-27 DIAGNOSIS — R14.0 BLOATING: ICD-10-CM

## 2021-01-27 PROCEDURE — 99214 OFFICE O/P EST MOD 30 MIN: CPT | Performed by: NURSE PRACTITIONER

## 2021-01-27 PROCEDURE — G8427 DOCREV CUR MEDS BY ELIG CLIN: HCPCS | Performed by: NURSE PRACTITIONER

## 2021-01-27 RX ORDER — TESTOSTERONE 16.2 MG/G
GEL TRANSDERMAL
Qty: 75 | Refills: 0 | Status: ON HOLD | COMMUNITY
Start: 2013-08-20

## 2021-01-27 RX ORDER — ASPIRIN 81 MG
TABLET, DELAYED RELEASE (ENTERIC COATED) ORAL
Qty: 30 | Refills: 0 | Status: ON HOLD | COMMUNITY
Start: 2014-02-14

## 2021-01-27 RX ORDER — OMEPRAZOLE 40 MG/1
1 CAPSULE CAPSULE, DELAYED RELEASE ORAL ONCE A DAY
Status: ACTIVE | COMMUNITY

## 2021-01-27 RX ORDER — TOBRAMYCIN AND DEXAMETHASONE 3; 1 MG/G; MG/G
OINTMENT OPHTHALMIC
Qty: 3 | Refills: 0 | Status: ON HOLD | COMMUNITY
Start: 2019-12-03

## 2021-01-27 RX ORDER — AMOXICILLIN 500 MG/1
CAPSULE ORAL
Qty: 30 | Refills: 0 | Status: ON HOLD | COMMUNITY
Start: 2020-05-12

## 2021-01-27 RX ORDER — ESZOPICLONE 3 MG/1
TAKE 1 TABLET AT BEDTIME AS NEEDED FOR SLEEP TABLET, COATED ORAL
Qty: 30 | Refills: 0 | Status: ACTIVE | COMMUNITY
Start: 2013-06-20

## 2021-01-27 RX ORDER — TOBRAMYCIN 3 MG/ML
SOLUTION/ DROPS OPHTHALMIC
Qty: 5 | Refills: 0 | Status: ON HOLD | COMMUNITY
Start: 2013-07-29

## 2021-01-27 RX ORDER — CLOBETASOL PROPIONATE 0.5 MG/G
AEROSOL, FOAM TOPICAL
Qty: 50 | Refills: 0 | Status: ON HOLD | COMMUNITY
Start: 2014-05-28

## 2021-01-27 RX ORDER — LEVOCETIRIZINE DIHYDROCHLORIDE 5 MG/1
TABLET ORAL
Qty: 30 | Refills: 0 | Status: ON HOLD | COMMUNITY
Start: 2013-12-05

## 2021-01-27 RX ORDER — ESOMEPRAZOLE MAGNESIUM 40 MG/1
1 CAPSULE CAPSULE, DELAYED RELEASE ORAL ONCE A DAY
OUTPATIENT

## 2021-01-27 RX ORDER — TACROLIMUS 1 MG/G
OINTMENT TOPICAL
Qty: 60 | Refills: 0 | Status: ON HOLD | COMMUNITY
Start: 2011-10-31

## 2021-01-27 RX ORDER — DICLOFENAC SODIUM 1 %
GEL (GRAM) TOPICAL
Qty: 255 | Refills: 0 | Status: ON HOLD | COMMUNITY
Start: 2011-05-18

## 2021-01-27 RX ORDER — CEPHALEXIN 500 MG/1
CAPSULE ORAL
Qty: 28 | Refills: 0 | Status: ON HOLD | COMMUNITY
Start: 2012-02-10

## 2021-01-27 RX ORDER — SILDENAFIL 100 MG/1
TAKE 1 TABLET DAILY 1 HOUR BEFORE NEEDED TABLET, FILM COATED ORAL
Refills: 0 | Status: ACTIVE | COMMUNITY
Start: 2019-07-15

## 2021-01-27 RX ORDER — PIROXICAM 20 MG/1
CAPSULE ORAL
Qty: 30 | Refills: 0 | Status: ON HOLD | COMMUNITY
Start: 2014-04-18

## 2021-01-27 RX ORDER — SULFAMETHOXAZOLE AND TRIMETHOPRIM 800; 160 MG/1; MG/1
TABLET ORAL
Qty: 10 | Refills: 0 | Status: ON HOLD | COMMUNITY
Start: 2019-04-26

## 2021-01-27 RX ORDER — AZITHROMYCIN DIHYDRATE 250 MG/1
TAKE 2 TABLETS BY MOUTH TODAY, THEN TAKE 1 TABLET DAILY FOR 4 DAYS TABLET, FILM COATED ORAL
Qty: 6 | Refills: 0 | Status: ON HOLD | COMMUNITY
Start: 2012-01-29

## 2021-01-27 RX ORDER — DICLOFENAC SODIUM 75 MG/1
TABLET, DELAYED RELEASE ORAL
Qty: 60 | Refills: 0 | Status: ON HOLD | COMMUNITY
Start: 2011-04-25

## 2021-01-27 RX ORDER — CIPROFLOXACIN HYDROCHLORIDE 500 MG/1
TABLET, FILM COATED ORAL
Qty: 20 | Refills: 0 | Status: ON HOLD | COMMUNITY
Start: 2013-12-26

## 2021-01-27 RX ORDER — MELOXICAM 15 MG/1
TABLET ORAL
Qty: 30 | Refills: 0 | Status: ON HOLD | COMMUNITY
Start: 2012-03-27

## 2021-01-27 RX ORDER — COLCHICINE 0.6 MG/1
TABLET, FILM COATED ORAL
Qty: 60 | Refills: 0 | Status: ON HOLD | COMMUNITY
Start: 2013-11-19

## 2021-01-27 RX ORDER — ZALEPLON 10 MG/1
CAPSULE ORAL
Qty: 45 | Refills: 0 | Status: ON HOLD | COMMUNITY
Start: 2012-03-05

## 2021-01-27 RX ORDER — MELOXICAM 7.5 MG/1
TABLET ORAL
Qty: 60 | Refills: 0 | Status: ON HOLD | COMMUNITY
Start: 2018-09-05

## 2021-01-27 RX ORDER — PREDNISOLONE ACETATE 10 MG/ML
SUSPENSION/ DROPS OPHTHALMIC
Qty: 5 | Refills: 0 | Status: ON HOLD | COMMUNITY
Start: 2018-10-26

## 2021-01-27 RX ORDER — PANTOPRAZOLE SODIUM 40 MG/1
1 TABLET TABLET, DELAYED RELEASE ORAL ONCE A DAY
Qty: 90 TABLET | Refills: 2 | OUTPATIENT
Start: 2021-01-27

## 2021-01-27 RX ORDER — SERTRALINE HYDROCHLORIDE 112 UG/1
TABLET ORAL
Qty: 90 | Refills: 0 | Status: ACTIVE | COMMUNITY
Start: 2018-09-17

## 2021-01-27 RX ORDER — ATORVASTATIN CALCIUM 20 MG/1
TAKE 1 TABLET DAILY TABLET, FILM COATED ORAL
Refills: 0 | Status: ON HOLD | COMMUNITY
Start: 2018-08-22

## 2021-01-27 RX ORDER — LEVOTHYROXINE SODIUM 125 UG/1
TAKE 1 TABLET DAILY TABLET ORAL
Refills: 0 | Status: ON HOLD | COMMUNITY
Start: 2011-03-14

## 2021-01-27 RX ORDER — PREDNISONE 5 MG/1
TABLET ORAL
Qty: 42 | Refills: 0 | Status: ON HOLD | COMMUNITY
Start: 2011-05-18

## 2021-01-27 RX ORDER — NAPROXEN 500 MG/1
TABLET ORAL
Qty: 60 | Refills: 0 | Status: ON HOLD | COMMUNITY
Start: 2011-12-30

## 2021-01-27 RX ORDER — SODIUM SULFACETAMIDE 100 MG/ML
LOTION TOPICAL
Qty: 118 | Refills: 0 | Status: ON HOLD | COMMUNITY
Start: 2019-05-24

## 2021-01-27 RX ORDER — TRAZODONE HYDROCHLORIDE 50 MG/1
TAKE 1 TABLET DAILY TABLET ORAL
Qty: 30 | Refills: 1 | Status: ACTIVE | COMMUNITY
Start: 2018-09-11

## 2021-01-27 RX ORDER — FAMOTIDINE 20 MG/1
TABLET ORAL
Qty: 60 | Refills: 0 | Status: ON HOLD | COMMUNITY
Start: 2014-02-14

## 2021-01-27 RX ORDER — PANTOPRAZOLE SODIUM 40 MG/1
TABLET, DELAYED RELEASE ORAL
Qty: 30 | Refills: 0 | Status: ON HOLD | COMMUNITY
Start: 2013-12-26

## 2021-01-27 RX ORDER — ESOMEPRAZOLE MAGNESIUM 40 MG/1
1 CAPSULE CAPSULE, DELAYED RELEASE ORAL ONCE A DAY
Status: ACTIVE | COMMUNITY

## 2021-01-27 RX ORDER — ERYTHROMYCIN 5 MG/G
OINTMENT OPHTHALMIC
Qty: 3 | Refills: 0 | Status: ON HOLD | COMMUNITY
Start: 2013-07-15

## 2021-01-27 RX ORDER — CYCLOBENZAPRINE HYDROCHLORIDE 10 MG/1
TABLET, FILM COATED ORAL
Qty: 60 | Refills: 0 | Status: ON HOLD | COMMUNITY
Start: 2019-01-28

## 2021-01-27 RX ORDER — RABEPRAZOLE SODIUM 20 MG/1
TAKE 1 TABLET TWICE DAILY TABLET, DELAYED RELEASE ORAL
Qty: 60 | Refills: 3 | Status: ON HOLD | COMMUNITY
Start: 2020-03-27

## 2021-01-27 RX ORDER — AMITRIPTYLINE HYDROCHLORIDE 25 MG/1
1 TABLET AT BEDTIME TABLET, FILM COATED ORAL ONCE A DAY
Qty: 30 TABLET | Refills: 4 | Status: ON HOLD | COMMUNITY

## 2021-01-27 RX ORDER — PENICILLIN V POTASSIUM 500 MG/1
TABLET, FILM COATED ORAL
Qty: 20 | Refills: 0 | Status: ON HOLD | COMMUNITY
Start: 2014-12-24

## 2021-01-27 RX ORDER — BENZONATATE 100 MG/1
CAPSULE ORAL
Qty: 30 | Refills: 0 | Status: ON HOLD | COMMUNITY
Start: 2011-12-05

## 2021-01-27 RX ORDER — ZOLPIDEM TARTRATE 5 MG/1
TABLET, FILM COATED ORAL
Qty: 30 | Refills: 0 | Status: ON HOLD | COMMUNITY
Start: 2013-03-05

## 2021-01-27 RX ORDER — METHYLPREDNISOLONE 4 MG/1
TAKE BY MOUTH AS DIRECTED ON INSIDE OF PACKAGE TABLET ORAL
Qty: 21 | Refills: 0 | Status: ON HOLD | COMMUNITY
Start: 2020-05-12

## 2021-01-27 RX ORDER — HYDROXYZINE PAMOATE 25 MG/1
CAPSULE ORAL
Qty: 30 | Refills: 0 | Status: ON HOLD | COMMUNITY
Start: 2013-12-05

## 2021-01-27 RX ORDER — AMOXICILLIN AND CLAVULANATE POTASSIUM 875; 125 MG/1; MG/1
TABLET, FILM COATED ORAL
Qty: 14 | Refills: 0 | Status: ON HOLD | COMMUNITY
Start: 2019-12-05

## 2021-01-27 RX ORDER — LIDOCAINE 50 MG/G
PATCH TOPICAL
Qty: 30 | Refills: 0 | Status: ACTIVE | COMMUNITY
Start: 2018-09-11

## 2021-01-27 RX ORDER — BROMPHENIRAMINE MALEATE, PSEUDOEPHEDRINE HYDROCHLORIDE, 2; 30; 10 MG/5ML; MG/5ML; MG/5ML
SYRUP ORAL
Qty: 120 | Refills: 0 | Status: ON HOLD | COMMUNITY
Start: 2014-12-30

## 2021-01-27 RX ORDER — DICLOFENAC SODIUM 10 MG/G
GEL TOPICAL
Qty: 100 | Refills: 0 | Status: ON HOLD | COMMUNITY
Start: 2019-11-07

## 2021-01-27 RX ORDER — CELECOXIB 200 MG/1
CAPSULE ORAL
Qty: 30 | Refills: 0 | Status: ON HOLD | COMMUNITY
Start: 2013-06-13

## 2021-01-27 RX ORDER — TRIAMCINOLONE ACETONIDE 0.25 MG/ML
LOTION TOPICAL
Qty: 60 | Refills: 0 | Status: ON HOLD | COMMUNITY
Start: 2014-09-04

## 2021-01-27 RX ORDER — DOXYCYCLINE HYCLATE 100 MG/1
TABLET ORAL
Qty: 7 | Refills: 0 | Status: ON HOLD | COMMUNITY
Start: 2013-08-08

## 2021-01-27 RX ORDER — ONDANSETRON HYDROCHLORIDE 4 MG/1
TABLET, FILM COATED ORAL
Qty: 15 | Refills: 0 | Status: ON HOLD | COMMUNITY
Start: 2020-06-22

## 2021-01-27 RX ORDER — ECONAZOLE NITRATE 10 MG/G
APPLY AND GENTLY MASSAGE INTO AFFECTED AREA(S) TWICE DAILY CREAM TOPICAL
Refills: 0 | Status: ON HOLD | COMMUNITY
Start: 2014-01-20

## 2021-01-27 RX ORDER — CLOBETASOL PROPIONATE 0.05 G/100ML
SHAMPOO TOPICAL
Qty: 118 | Refills: 0 | Status: ON HOLD | COMMUNITY
Start: 2012-04-04

## 2021-01-27 RX ORDER — ZOLPIDEM TARTRATE 10 MG/1
TABLET, FILM COATED ORAL
Qty: 30 | Refills: 0 | Status: ON HOLD | COMMUNITY
Start: 2011-03-04

## 2021-01-27 RX ORDER — NAFTIFINE HYDROCHLORIDE 10 MG/G
GEL TOPICAL
Qty: 90 | Refills: 0 | Status: ON HOLD | COMMUNITY
Start: 2012-04-04

## 2021-01-27 RX ORDER — CARISOPRODOL 350 MG/1
TABLET ORAL
Qty: 30 | Refills: 0 | Status: ON HOLD | COMMUNITY
Start: 2012-09-24

## 2021-01-27 RX ORDER — NEOMYCIN SULFATE, POLYMYXIN B SULFATE AND HYDROCORTISONE 10; 3.5; 1 MG/ML; MG/ML; [USP'U]/ML
SUSPENSION/ DROPS AURICULAR (OTIC)
Qty: 7 | Refills: 0 | Status: ON HOLD | COMMUNITY
Start: 2014-09-20

## 2021-01-27 RX ORDER — TOBRAMYCIN AND DEXAMETHASONE 3; 1 MG/ML; MG/ML
SUSPENSION/ DROPS OPHTHALMIC
Qty: 5 | Refills: 0 | Status: ON HOLD | COMMUNITY
Start: 2014-09-04

## 2021-01-27 RX ORDER — CEFDINIR 300 MG/1
CAPSULE ORAL
Qty: 20 | Refills: 0 | Status: ON HOLD | COMMUNITY
Start: 2011-11-22

## 2021-01-27 RX ORDER — TRAMADOL HYDROCHLORIDE 50 MG/1
PRN TABLET ORAL
Refills: 0 | Status: ACTIVE | COMMUNITY
Start: 2019-03-13

## 2021-01-27 RX ORDER — HYDROCODONE BITARTRATE AND ACETAMINOPHEN 7.5; 325 MG/1; MG/1
TABLET ORAL
Qty: 50 | Refills: 0 | Status: ON HOLD | COMMUNITY
Start: 2013-06-13

## 2021-01-27 RX ORDER — FLUOCINOLONE ACETONIDE OIL 0.11 MG/ML
OIL AURICULAR (OTIC)
Qty: 20 | Refills: 0 | Status: ON HOLD | COMMUNITY
Start: 2014-09-04

## 2021-01-27 RX ORDER — ROSUVASTATIN CALCIUM 10 MG/1
1 TABLET TABLET, FILM COATED ORAL ONCE A DAY
Status: ON HOLD | COMMUNITY

## 2021-01-27 RX ORDER — METRONIDAZOLE 500 MG/1
TABLET ORAL
Qty: 30 | Refills: 0 | Status: ON HOLD | COMMUNITY
Start: 2013-12-26

## 2021-01-27 RX ORDER — CITALOPRAM 20 MG/1
TABLET ORAL
Qty: 15 | Refills: 0 | Status: ON HOLD | COMMUNITY
Start: 2012-03-05

## 2021-01-27 RX ORDER — FAMOTIDINE 40 MG/1
TABLET ORAL
Qty: 30 | Refills: 0 | Status: ON HOLD | COMMUNITY
Start: 2019-10-31

## 2021-01-27 NOTE — HPI-TODAY'S VISIT:
70-year-old male with a history of acid reflux, presenting for follow-up of abdominal pain.  He was last seen in the office on 12/3/2020 with complaints of belching, bloating and GERD.  Belching and bloating was likely secondary to functional disorder.  Trazodone and Lunesta or stopped  and replaced with amitriptyline.  He was to trial FDgard.  If no improvement, an EGD was a consideration.  He continues with "attacks" of reflux, prompting multiple ED visits. Esomeprazole does not seem to be controlling symptoms. No dysphagia. He denies any heartburn. He mainly complains of a burning sensation in his stomach, with burning, belching and nausea feeling. No vomiting. He has increased bloating as well. No weight loss. No hematemesis or melena. He has bowel movements regularly with occasional constipation. Additionally, he reports new onset of a cough that is not dry but not overly productive. Cough is described as a nuiscance. He is not taking Amitriptyline because it made him feel weird. He resumed trazadone and Lunesta.

## 2021-02-16 ENCOUNTER — CLAIMS CREATED FROM THE CLAIM WINDOW (OUTPATIENT)
Dept: URBAN - METROPOLITAN AREA CLINIC 4 | Facility: CLINIC | Age: 71
End: 2021-02-16
Payer: MEDICARE

## 2021-02-16 ENCOUNTER — OFFICE VISIT (OUTPATIENT)
Dept: URBAN - METROPOLITAN AREA SURGERY CENTER 25 | Facility: SURGERY CENTER | Age: 71
End: 2021-02-16
Payer: MEDICARE

## 2021-02-16 DIAGNOSIS — K21.9 ACID REFLUX: ICD-10-CM

## 2021-02-16 DIAGNOSIS — K22.70 BARRETT ESOPHAGUS: ICD-10-CM

## 2021-02-16 DIAGNOSIS — K22.70 BARRETT'S ESOPHAGUS WITHOUT DYSPLASIA: ICD-10-CM

## 2021-02-16 PROCEDURE — 43239 EGD BIOPSY SINGLE/MULTIPLE: CPT | Performed by: INTERNAL MEDICINE

## 2021-02-16 PROCEDURE — G8907 PT DOC NO EVENTS ON DISCHARG: HCPCS | Performed by: INTERNAL MEDICINE

## 2021-02-16 PROCEDURE — 88305 TISSUE EXAM BY PATHOLOGIST: CPT | Performed by: PATHOLOGY

## 2021-02-16 RX ORDER — SILDENAFIL 100 MG/1
TAKE 1 TABLET DAILY 1 HOUR BEFORE NEEDED TABLET, FILM COATED ORAL
Refills: 0 | Status: ACTIVE | COMMUNITY
Start: 2019-07-15

## 2021-02-16 RX ORDER — CLOBETASOL PROPIONATE 0.05 G/100ML
SHAMPOO TOPICAL
Qty: 118 | Refills: 0 | Status: ON HOLD | COMMUNITY
Start: 2012-04-04

## 2021-02-16 RX ORDER — MELOXICAM 7.5 MG/1
TABLET ORAL
Qty: 60 | Refills: 0 | Status: ON HOLD | COMMUNITY
Start: 2018-09-05

## 2021-02-16 RX ORDER — PANTOPRAZOLE SODIUM 40 MG/1
1 TABLET TABLET, DELAYED RELEASE ORAL ONCE A DAY
Qty: 90 TABLET | Refills: 2 | Status: ACTIVE | COMMUNITY
Start: 2021-01-27

## 2021-02-16 RX ORDER — LEVOCETIRIZINE DIHYDROCHLORIDE 5 MG/1
TABLET ORAL
Qty: 30 | Refills: 0 | Status: ON HOLD | COMMUNITY
Start: 2013-12-05

## 2021-02-16 RX ORDER — LIDOCAINE 50 MG/G
PATCH TOPICAL
Qty: 30 | Refills: 0 | Status: ACTIVE | COMMUNITY
Start: 2018-09-11

## 2021-02-16 RX ORDER — CELECOXIB 200 MG/1
CAPSULE ORAL
Qty: 30 | Refills: 0 | Status: ON HOLD | COMMUNITY
Start: 2013-06-13

## 2021-02-16 RX ORDER — TRAZODONE HYDROCHLORIDE 50 MG/1
TAKE 1 TABLET DAILY TABLET ORAL
Qty: 30 | Refills: 1 | Status: ACTIVE | COMMUNITY
Start: 2018-09-11

## 2021-02-16 RX ORDER — FAMOTIDINE 20 MG/1
TABLET ORAL
Qty: 60 | Refills: 0 | Status: ON HOLD | COMMUNITY
Start: 2014-02-14

## 2021-02-16 RX ORDER — NAFTIFINE HYDROCHLORIDE 10 MG/G
GEL TOPICAL
Qty: 90 | Refills: 0 | Status: ON HOLD | COMMUNITY
Start: 2012-04-04

## 2021-02-16 RX ORDER — PENICILLIN V POTASSIUM 500 MG/1
TABLET, FILM COATED ORAL
Qty: 20 | Refills: 0 | Status: ON HOLD | COMMUNITY
Start: 2014-12-24

## 2021-02-16 RX ORDER — TACROLIMUS 1 MG/G
OINTMENT TOPICAL
Qty: 60 | Refills: 0 | Status: ON HOLD | COMMUNITY
Start: 2011-10-31

## 2021-02-16 RX ORDER — LEVOTHYROXINE SODIUM 125 UG/1
TAKE 1 TABLET DAILY TABLET ORAL
Refills: 0 | Status: ON HOLD | COMMUNITY
Start: 2011-03-14

## 2021-02-16 RX ORDER — METHYLPREDNISOLONE 4 MG/1
TAKE BY MOUTH AS DIRECTED ON INSIDE OF PACKAGE TABLET ORAL
Qty: 21 | Refills: 0 | Status: ON HOLD | COMMUNITY
Start: 2020-05-12

## 2021-02-16 RX ORDER — TOBRAMYCIN AND DEXAMETHASONE 3; 1 MG/ML; MG/ML
SUSPENSION/ DROPS OPHTHALMIC
Qty: 5 | Refills: 0 | Status: ON HOLD | COMMUNITY
Start: 2014-09-04

## 2021-02-16 RX ORDER — TRAMADOL HYDROCHLORIDE 50 MG/1
PRN TABLET ORAL
Refills: 0 | Status: ACTIVE | COMMUNITY
Start: 2019-03-13

## 2021-02-16 RX ORDER — CARISOPRODOL 350 MG/1
TABLET ORAL
Qty: 30 | Refills: 0 | Status: ON HOLD | COMMUNITY
Start: 2012-09-24

## 2021-02-16 RX ORDER — HYDROXYZINE PAMOATE 25 MG/1
CAPSULE ORAL
Qty: 30 | Refills: 0 | Status: ON HOLD | COMMUNITY
Start: 2013-12-05

## 2021-02-16 RX ORDER — SERTRALINE HYDROCHLORIDE 112 UG/1
TABLET ORAL
Qty: 90 | Refills: 0 | Status: ACTIVE | COMMUNITY
Start: 2018-09-17

## 2021-02-16 RX ORDER — HYDROCODONE BITARTRATE AND ACETAMINOPHEN 7.5; 325 MG/1; MG/1
TABLET ORAL
Qty: 50 | Refills: 0 | Status: ON HOLD | COMMUNITY
Start: 2013-06-13

## 2021-02-16 RX ORDER — ROSUVASTATIN CALCIUM 10 MG/1
1 TABLET TABLET, FILM COATED ORAL ONCE A DAY
Status: ON HOLD | COMMUNITY

## 2021-02-16 RX ORDER — RABEPRAZOLE SODIUM 20 MG/1
TAKE 1 TABLET TWICE DAILY TABLET, DELAYED RELEASE ORAL
Qty: 60 | Refills: 3 | Status: ON HOLD | COMMUNITY
Start: 2020-03-27

## 2021-02-16 RX ORDER — ONDANSETRON HYDROCHLORIDE 4 MG/1
TABLET, FILM COATED ORAL
Qty: 15 | Refills: 0 | Status: ON HOLD | COMMUNITY
Start: 2020-06-22

## 2021-02-16 RX ORDER — AMITRIPTYLINE HYDROCHLORIDE 25 MG/1
1 TABLET AT BEDTIME TABLET, FILM COATED ORAL ONCE A DAY
Qty: 30 TABLET | Refills: 4 | Status: ON HOLD | COMMUNITY

## 2021-02-16 RX ORDER — DICLOFENAC SODIUM 10 MG/G
GEL TOPICAL
Qty: 100 | Refills: 0 | Status: ON HOLD | COMMUNITY
Start: 2019-11-07

## 2021-02-16 RX ORDER — BROMPHENIRAMINE MALEATE, PSEUDOEPHEDRINE HYDROCHLORIDE, 2; 30; 10 MG/5ML; MG/5ML; MG/5ML
SYRUP ORAL
Qty: 120 | Refills: 0 | Status: ON HOLD | COMMUNITY
Start: 2014-12-30

## 2021-02-16 RX ORDER — PANTOPRAZOLE SODIUM 40 MG/1
TABLET, DELAYED RELEASE ORAL
Qty: 30 | Refills: 0 | Status: ON HOLD | COMMUNITY
Start: 2013-12-26

## 2021-02-16 RX ORDER — ERYTHROMYCIN 5 MG/G
OINTMENT OPHTHALMIC
Qty: 3 | Refills: 0 | Status: ON HOLD | COMMUNITY
Start: 2013-07-15

## 2021-02-16 RX ORDER — CEPHALEXIN 500 MG/1
CAPSULE ORAL
Qty: 28 | Refills: 0 | Status: ON HOLD | COMMUNITY
Start: 2012-02-10

## 2021-02-16 RX ORDER — NAPROXEN 500 MG/1
TABLET ORAL
Qty: 60 | Refills: 0 | Status: ON HOLD | COMMUNITY
Start: 2011-12-30

## 2021-02-16 RX ORDER — DOXYCYCLINE HYCLATE 100 MG/1
TABLET ORAL
Qty: 7 | Refills: 0 | Status: ON HOLD | COMMUNITY
Start: 2013-08-08

## 2021-02-16 RX ORDER — OMEPRAZOLE 40 MG/1
1 CAPSULE CAPSULE, DELAYED RELEASE ORAL ONCE A DAY
Status: ACTIVE | COMMUNITY

## 2021-02-16 RX ORDER — CIPROFLOXACIN HYDROCHLORIDE 500 MG/1
TABLET, FILM COATED ORAL
Qty: 20 | Refills: 0 | Status: ON HOLD | COMMUNITY
Start: 2013-12-26

## 2021-02-16 RX ORDER — METRONIDAZOLE 500 MG/1
TABLET ORAL
Qty: 30 | Refills: 0 | Status: ON HOLD | COMMUNITY
Start: 2013-12-26

## 2021-02-16 RX ORDER — CYCLOBENZAPRINE HYDROCHLORIDE 10 MG/1
TABLET, FILM COATED ORAL
Qty: 60 | Refills: 0 | Status: ON HOLD | COMMUNITY
Start: 2019-01-28

## 2021-02-16 RX ORDER — SODIUM SULFACETAMIDE 100 MG/ML
LOTION TOPICAL
Qty: 118 | Refills: 0 | Status: ON HOLD | COMMUNITY
Start: 2019-05-24

## 2021-02-16 RX ORDER — TESTOSTERONE 16.2 MG/G
GEL TRANSDERMAL
Qty: 75 | Refills: 0 | Status: ON HOLD | COMMUNITY
Start: 2013-08-20

## 2021-02-16 RX ORDER — DICLOFENAC SODIUM 1 %
GEL (GRAM) TOPICAL
Qty: 255 | Refills: 0 | Status: ON HOLD | COMMUNITY
Start: 2011-05-18

## 2021-02-16 RX ORDER — TRIAMCINOLONE ACETONIDE 0.25 MG/ML
LOTION TOPICAL
Qty: 60 | Refills: 0 | Status: ON HOLD | COMMUNITY
Start: 2014-09-04

## 2021-02-16 RX ORDER — PREDNISOLONE ACETATE 10 MG/ML
SUSPENSION/ DROPS OPHTHALMIC
Qty: 5 | Refills: 0 | Status: ON HOLD | COMMUNITY
Start: 2018-10-26

## 2021-02-16 RX ORDER — ESZOPICLONE 3 MG/1
TAKE 1 TABLET AT BEDTIME AS NEEDED FOR SLEEP TABLET, COATED ORAL
Qty: 30 | Refills: 0 | Status: ACTIVE | COMMUNITY
Start: 2013-06-20

## 2021-02-16 RX ORDER — ZOLPIDEM TARTRATE 10 MG/1
TABLET, FILM COATED ORAL
Qty: 30 | Refills: 0 | Status: ON HOLD | COMMUNITY
Start: 2011-03-04

## 2021-02-16 RX ORDER — BENZONATATE 100 MG/1
CAPSULE ORAL
Qty: 30 | Refills: 0 | Status: ON HOLD | COMMUNITY
Start: 2011-12-05

## 2021-02-16 RX ORDER — TOBRAMYCIN 3 MG/ML
SOLUTION/ DROPS OPHTHALMIC
Qty: 5 | Refills: 0 | Status: ON HOLD | COMMUNITY
Start: 2013-07-29

## 2021-02-16 RX ORDER — PIROXICAM 20 MG/1
CAPSULE ORAL
Qty: 30 | Refills: 0 | Status: ON HOLD | COMMUNITY
Start: 2014-04-18

## 2021-02-16 RX ORDER — CLOBETASOL PROPIONATE 0.5 MG/G
AEROSOL, FOAM TOPICAL
Qty: 50 | Refills: 0 | Status: ON HOLD | COMMUNITY
Start: 2014-05-28

## 2021-02-16 RX ORDER — TOBRAMYCIN AND DEXAMETHASONE 3; 1 MG/G; MG/G
OINTMENT OPHTHALMIC
Qty: 3 | Refills: 0 | Status: ON HOLD | COMMUNITY
Start: 2019-12-03

## 2021-02-16 RX ORDER — FAMOTIDINE 40 MG/1
TABLET ORAL
Qty: 30 | Refills: 0 | Status: ON HOLD | COMMUNITY
Start: 2019-10-31

## 2021-02-16 RX ORDER — ATORVASTATIN CALCIUM 20 MG/1
TAKE 1 TABLET DAILY TABLET, FILM COATED ORAL
Refills: 0 | Status: ON HOLD | COMMUNITY
Start: 2018-08-22

## 2021-02-16 RX ORDER — ZOLPIDEM TARTRATE 5 MG/1
TABLET, FILM COATED ORAL
Qty: 30 | Refills: 0 | Status: ON HOLD | COMMUNITY
Start: 2013-03-05

## 2021-02-16 RX ORDER — FLUOCINOLONE ACETONIDE OIL 0.11 MG/ML
OIL AURICULAR (OTIC)
Qty: 20 | Refills: 0 | Status: ON HOLD | COMMUNITY
Start: 2014-09-04

## 2021-02-16 RX ORDER — SULFAMETHOXAZOLE AND TRIMETHOPRIM 800; 160 MG/1; MG/1
TABLET ORAL
Qty: 10 | Refills: 0 | Status: ON HOLD | COMMUNITY
Start: 2019-04-26

## 2021-02-16 RX ORDER — CEFDINIR 300 MG/1
CAPSULE ORAL
Qty: 20 | Refills: 0 | Status: ON HOLD | COMMUNITY
Start: 2011-11-22

## 2021-02-16 RX ORDER — AZITHROMYCIN DIHYDRATE 250 MG/1
TAKE 2 TABLETS BY MOUTH TODAY, THEN TAKE 1 TABLET DAILY FOR 4 DAYS TABLET, FILM COATED ORAL
Qty: 6 | Refills: 0 | Status: ON HOLD | COMMUNITY
Start: 2012-01-29

## 2021-02-16 RX ORDER — AMOXICILLIN AND CLAVULANATE POTASSIUM 875; 125 MG/1; MG/1
TABLET, FILM COATED ORAL
Qty: 14 | Refills: 0 | Status: ON HOLD | COMMUNITY
Start: 2019-12-05

## 2021-02-16 RX ORDER — DICLOFENAC SODIUM 75 MG/1
TABLET, DELAYED RELEASE ORAL
Qty: 60 | Refills: 0 | Status: ON HOLD | COMMUNITY
Start: 2011-04-25

## 2021-02-16 RX ORDER — MELOXICAM 15 MG/1
TABLET ORAL
Qty: 30 | Refills: 0 | Status: ON HOLD | COMMUNITY
Start: 2012-03-27

## 2021-02-16 RX ORDER — ZALEPLON 10 MG/1
CAPSULE ORAL
Qty: 45 | Refills: 0 | Status: ON HOLD | COMMUNITY
Start: 2012-03-05

## 2021-02-16 RX ORDER — ASPIRIN 81 MG
TABLET, DELAYED RELEASE (ENTERIC COATED) ORAL
Qty: 30 | Refills: 0 | Status: ON HOLD | COMMUNITY
Start: 2014-02-14

## 2021-02-16 RX ORDER — PREDNISONE 5 MG/1
TABLET ORAL
Qty: 42 | Refills: 0 | Status: ON HOLD | COMMUNITY
Start: 2011-05-18

## 2021-02-16 RX ORDER — AMOXICILLIN 500 MG/1
CAPSULE ORAL
Qty: 30 | Refills: 0 | Status: ON HOLD | COMMUNITY
Start: 2020-05-12

## 2021-02-16 RX ORDER — NEOMYCIN SULFATE, POLYMYXIN B SULFATE AND HYDROCORTISONE 10; 3.5; 1 MG/ML; MG/ML; [USP'U]/ML
SUSPENSION/ DROPS AURICULAR (OTIC)
Qty: 7 | Refills: 0 | Status: ON HOLD | COMMUNITY
Start: 2014-09-20

## 2021-02-16 RX ORDER — ECONAZOLE NITRATE 10 MG/G
APPLY AND GENTLY MASSAGE INTO AFFECTED AREA(S) TWICE DAILY CREAM TOPICAL
Refills: 0 | Status: ON HOLD | COMMUNITY
Start: 2014-01-20

## 2021-02-16 RX ORDER — CITALOPRAM 20 MG/1
TABLET ORAL
Qty: 15 | Refills: 0 | Status: ON HOLD | COMMUNITY
Start: 2012-03-05

## 2021-02-16 RX ORDER — COLCHICINE 0.6 MG/1
TABLET, FILM COATED ORAL
Qty: 60 | Refills: 0 | Status: ON HOLD | COMMUNITY
Start: 2013-11-19

## 2021-03-03 ENCOUNTER — OFFICE VISIT (OUTPATIENT)
Dept: URBAN - METROPOLITAN AREA CLINIC 113 | Facility: CLINIC | Age: 71
End: 2021-03-03
Payer: MEDICARE

## 2021-03-03 ENCOUNTER — DASHBOARD ENCOUNTERS (OUTPATIENT)
Age: 71
End: 2021-03-03

## 2021-03-03 VITALS
BODY MASS INDEX: 28.19 KG/M2 | DIASTOLIC BLOOD PRESSURE: 71 MMHG | HEIGHT: 68 IN | WEIGHT: 186 LBS | SYSTOLIC BLOOD PRESSURE: 123 MMHG | TEMPERATURE: 96.9 F | RESPIRATION RATE: 20 BRPM | HEART RATE: 52 BPM

## 2021-03-03 DIAGNOSIS — K22.70 BARRETT'S ESOPHAGUS WITHOUT DYSPLASIA: ICD-10-CM

## 2021-03-03 DIAGNOSIS — K21.9 GASTROESOPHAGEAL REFLUX DISEASE WITHOUT ESOPHAGITIS: ICD-10-CM

## 2021-03-03 DIAGNOSIS — R14.2 ERUCTATION: ICD-10-CM

## 2021-03-03 PROBLEM — 266435005: Status: ACTIVE | Noted: 2020-12-03

## 2021-03-03 PROCEDURE — 99213 OFFICE O/P EST LOW 20 MIN: CPT | Performed by: INTERNAL MEDICINE

## 2021-03-03 RX ORDER — ESZOPICLONE 3 MG/1
TAKE 1 TABLET AT BEDTIME AS NEEDED FOR SLEEP TABLET, COATED ORAL
Qty: 30 | Refills: 0 | Status: DISCONTINUED | COMMUNITY
Start: 2013-06-20

## 2021-03-03 RX ORDER — DOXYCYCLINE HYCLATE 100 MG/1
TABLET ORAL
Qty: 7 | Refills: 0 | Status: DISCONTINUED | COMMUNITY
Start: 2013-08-08

## 2021-03-03 RX ORDER — NEOMYCIN SULFATE, POLYMYXIN B SULFATE AND HYDROCORTISONE 10; 3.5; 1 MG/ML; MG/ML; [USP'U]/ML
SUSPENSION/ DROPS AURICULAR (OTIC)
Qty: 7 | Refills: 0 | Status: DISCONTINUED | COMMUNITY
Start: 2014-09-20

## 2021-03-03 RX ORDER — NAFTIFINE HYDROCHLORIDE 10 MG/G
GEL TOPICAL
Qty: 90 | Refills: 0 | Status: DISCONTINUED | COMMUNITY
Start: 2012-04-04

## 2021-03-03 RX ORDER — DICLOFENAC SODIUM 1 %
GEL (GRAM) TOPICAL
Qty: 255 | Refills: 0 | Status: DISCONTINUED | COMMUNITY
Start: 2011-05-18

## 2021-03-03 RX ORDER — HYDROXYZINE PAMOATE 25 MG/1
CAPSULE ORAL
Qty: 30 | Refills: 0 | Status: DISCONTINUED | COMMUNITY
Start: 2013-12-05

## 2021-03-03 RX ORDER — PENICILLIN V POTASSIUM 500 MG/1
TABLET, FILM COATED ORAL
Qty: 20 | Refills: 0 | Status: DISCONTINUED | COMMUNITY
Start: 2014-12-24

## 2021-03-03 RX ORDER — COLCHICINE 0.6 MG/1
TABLET, FILM COATED ORAL
Qty: 60 | Refills: 0 | Status: DISCONTINUED | COMMUNITY
Start: 2013-11-19

## 2021-03-03 RX ORDER — RABEPRAZOLE SODIUM 20 MG/1
TAKE 1 TABLET TWICE DAILY TABLET, DELAYED RELEASE ORAL
Qty: 60 | Refills: 3 | Status: DISCONTINUED | COMMUNITY
Start: 2020-03-27

## 2021-03-03 RX ORDER — CEFDINIR 300 MG/1
CAPSULE ORAL
Qty: 20 | Refills: 0 | Status: DISCONTINUED | COMMUNITY
Start: 2011-11-22

## 2021-03-03 RX ORDER — NAPROXEN 500 MG/1
TABLET ORAL
Qty: 60 | Refills: 0 | Status: DISCONTINUED | COMMUNITY
Start: 2011-12-30

## 2021-03-03 RX ORDER — OMEPRAZOLE 40 MG/1
1 CAPSULE CAPSULE, DELAYED RELEASE ORAL ONCE A DAY
Status: ACTIVE | COMMUNITY

## 2021-03-03 RX ORDER — CITALOPRAM 20 MG/1
TABLET ORAL
Qty: 15 | Refills: 0 | Status: DISCONTINUED | COMMUNITY
Start: 2012-03-05

## 2021-03-03 RX ORDER — FLUOCINOLONE ACETONIDE OIL 0.11 MG/ML
OIL AURICULAR (OTIC)
Qty: 20 | Refills: 0 | Status: DISCONTINUED | COMMUNITY
Start: 2014-09-04

## 2021-03-03 RX ORDER — SULFAMETHOXAZOLE AND TRIMETHOPRIM 800; 160 MG/1; MG/1
TABLET ORAL
Qty: 10 | Refills: 0 | Status: DISCONTINUED | COMMUNITY
Start: 2019-04-26

## 2021-03-03 RX ORDER — BENZONATATE 100 MG/1
CAPSULE ORAL
Qty: 30 | Refills: 0 | Status: DISCONTINUED | COMMUNITY
Start: 2011-12-05

## 2021-03-03 RX ORDER — TOBRAMYCIN AND DEXAMETHASONE 3; 1 MG/ML; MG/ML
SUSPENSION/ DROPS OPHTHALMIC
Qty: 5 | Refills: 0 | Status: DISCONTINUED | COMMUNITY
Start: 2014-09-04

## 2021-03-03 RX ORDER — TRAZODONE HYDROCHLORIDE 50 MG/1
TAKE 1 TABLET DAILY TABLET ORAL
Qty: 30 | Refills: 1 | Status: ACTIVE | COMMUNITY
Start: 2018-09-11

## 2021-03-03 RX ORDER — TACROLIMUS 1 MG/G
OINTMENT TOPICAL
Qty: 60 | Refills: 0 | Status: DISCONTINUED | COMMUNITY
Start: 2011-10-31

## 2021-03-03 RX ORDER — DICLOFENAC SODIUM 10 MG/G
GEL TOPICAL
Qty: 100 | Refills: 0 | Status: DISCONTINUED | COMMUNITY
Start: 2019-11-07

## 2021-03-03 RX ORDER — SILDENAFIL 100 MG/1
TAKE 1 TABLET DAILY 1 HOUR BEFORE NEEDED TABLET, FILM COATED ORAL
Refills: 0 | Status: ACTIVE | COMMUNITY
Start: 2019-07-15

## 2021-03-03 RX ORDER — HYDROCODONE BITARTRATE AND ACETAMINOPHEN 7.5; 325 MG/1; MG/1
TABLET ORAL
Qty: 50 | Refills: 0 | Status: DISCONTINUED | COMMUNITY
Start: 2013-06-13

## 2021-03-03 RX ORDER — TESTOSTERONE 16.2 MG/G
GEL TRANSDERMAL
Qty: 75 | Refills: 0 | Status: DISCONTINUED | COMMUNITY
Start: 2013-08-20

## 2021-03-03 RX ORDER — PREDNISOLONE ACETATE 10 MG/ML
SUSPENSION/ DROPS OPHTHALMIC
Qty: 5 | Refills: 0 | Status: DISCONTINUED | COMMUNITY
Start: 2018-10-26

## 2021-03-03 RX ORDER — TOBRAMYCIN 3 MG/ML
SOLUTION/ DROPS OPHTHALMIC
Qty: 5 | Refills: 0 | Status: DISCONTINUED | COMMUNITY
Start: 2013-07-29

## 2021-03-03 RX ORDER — CARISOPRODOL 350 MG/1
TABLET ORAL
Qty: 30 | Refills: 0 | Status: DISCONTINUED | COMMUNITY
Start: 2012-09-24

## 2021-03-03 RX ORDER — METHYLPREDNISOLONE 4 MG/1
TAKE BY MOUTH AS DIRECTED ON INSIDE OF PACKAGE TABLET ORAL
Qty: 21 | Refills: 0 | Status: DISCONTINUED | COMMUNITY
Start: 2020-05-12

## 2021-03-03 RX ORDER — ZALEPLON 10 MG/1
CAPSULE ORAL
Qty: 45 | Refills: 0 | Status: DISCONTINUED | COMMUNITY
Start: 2012-03-05

## 2021-03-03 RX ORDER — CYCLOBENZAPRINE HYDROCHLORIDE 10 MG/1
TABLET, FILM COATED ORAL
Qty: 60 | Refills: 0 | Status: DISCONTINUED | COMMUNITY
Start: 2019-01-28

## 2021-03-03 RX ORDER — PIROXICAM 20 MG/1
CAPSULE ORAL
Qty: 30 | Refills: 0 | Status: DISCONTINUED | COMMUNITY
Start: 2014-04-18

## 2021-03-03 RX ORDER — ZOLPIDEM TARTRATE 10 MG/1
TABLET, FILM COATED ORAL
Qty: 30 | Refills: 0 | Status: DISCONTINUED | COMMUNITY
Start: 2011-03-04

## 2021-03-03 RX ORDER — CLOBETASOL PROPIONATE 0.05 G/100ML
SHAMPOO TOPICAL
Qty: 118 | Refills: 0 | Status: DISCONTINUED | COMMUNITY
Start: 2012-04-04

## 2021-03-03 RX ORDER — AMOXICILLIN AND CLAVULANATE POTASSIUM 875; 125 MG/1; MG/1
TABLET, FILM COATED ORAL
Qty: 14 | Refills: 0 | Status: DISCONTINUED | COMMUNITY
Start: 2019-12-05

## 2021-03-03 RX ORDER — CEPHALEXIN 500 MG/1
CAPSULE ORAL
Qty: 28 | Refills: 0 | Status: DISCONTINUED | COMMUNITY
Start: 2012-02-10

## 2021-03-03 RX ORDER — TRIAMCINOLONE ACETONIDE 0.25 MG/ML
LOTION TOPICAL
Qty: 60 | Refills: 0 | Status: DISCONTINUED | COMMUNITY
Start: 2014-09-04

## 2021-03-03 RX ORDER — LEVOTHYROXINE SODIUM 125 UG/1
TAKE 1 TABLET DAILY TABLET ORAL
Refills: 0 | Status: DISCONTINUED | COMMUNITY
Start: 2011-03-14

## 2021-03-03 RX ORDER — ROSUVASTATIN CALCIUM 10 MG/1
1 TABLET TABLET, FILM COATED ORAL ONCE A DAY
Status: ACTIVE | COMMUNITY

## 2021-03-03 RX ORDER — CIPROFLOXACIN HYDROCHLORIDE 500 MG/1
TABLET, FILM COATED ORAL
Qty: 20 | Refills: 0 | Status: DISCONTINUED | COMMUNITY
Start: 2013-12-26

## 2021-03-03 RX ORDER — TRAMADOL HYDROCHLORIDE 50 MG/1
1 TABLET AS NEEDED TABLET ORAL
Refills: 0 | Status: ACTIVE | COMMUNITY
Start: 2019-03-13

## 2021-03-03 RX ORDER — PREDNISONE 5 MG/1
TABLET ORAL
Qty: 42 | Refills: 0 | Status: DISCONTINUED | COMMUNITY
Start: 2011-05-18

## 2021-03-03 RX ORDER — ONDANSETRON HYDROCHLORIDE 4 MG/1
TABLET, FILM COATED ORAL
Qty: 15 | Refills: 0 | Status: DISCONTINUED | COMMUNITY
Start: 2020-06-22

## 2021-03-03 RX ORDER — LEVOCETIRIZINE DIHYDROCHLORIDE 5 MG/1
TABLET ORAL
Qty: 30 | Refills: 0 | Status: DISCONTINUED | COMMUNITY
Start: 2013-12-05

## 2021-03-03 RX ORDER — AMITRIPTYLINE HYDROCHLORIDE 25 MG/1
1 TABLET AT BEDTIME TABLET, FILM COATED ORAL ONCE A DAY
Qty: 30 TABLET | Refills: 4 | Status: DISCONTINUED | COMMUNITY

## 2021-03-03 RX ORDER — ECONAZOLE NITRATE 10 MG/G
APPLY AND GENTLY MASSAGE INTO AFFECTED AREA(S) TWICE DAILY CREAM TOPICAL
Refills: 0 | Status: DISCONTINUED | COMMUNITY
Start: 2014-01-20

## 2021-03-03 RX ORDER — CELECOXIB 200 MG/1
CAPSULE ORAL
Qty: 30 | Refills: 0 | Status: DISCONTINUED | COMMUNITY
Start: 2013-06-13

## 2021-03-03 RX ORDER — SODIUM SULFACETAMIDE 100 MG/ML
LOTION TOPICAL
Qty: 118 | Refills: 0 | Status: DISCONTINUED | COMMUNITY
Start: 2019-05-24

## 2021-03-03 RX ORDER — DICLOFENAC SODIUM 75 MG/1
TABLET, DELAYED RELEASE ORAL
Qty: 60 | Refills: 0 | Status: DISCONTINUED | COMMUNITY
Start: 2011-04-25

## 2021-03-03 RX ORDER — ZOLPIDEM TARTRATE 5 MG/1
TABLET, FILM COATED ORAL
Qty: 30 | Refills: 0 | Status: DISCONTINUED | COMMUNITY
Start: 2013-03-05

## 2021-03-03 RX ORDER — AMOXICILLIN 500 MG/1
CAPSULE ORAL
Qty: 30 | Refills: 0 | Status: DISCONTINUED | COMMUNITY
Start: 2020-05-12

## 2021-03-03 RX ORDER — MELOXICAM 15 MG/1
TABLET ORAL
Qty: 30 | Refills: 0 | Status: DISCONTINUED | COMMUNITY
Start: 2012-03-27

## 2021-03-03 RX ORDER — ATORVASTATIN CALCIUM 20 MG/1
TAKE 1 TABLET DAILY TABLET, FILM COATED ORAL
Refills: 0 | Status: DISCONTINUED | COMMUNITY
Start: 2018-08-22

## 2021-03-03 RX ORDER — FAMOTIDINE 40 MG/1
1 TABLET AT BEDTIME TABLET ORAL ONCE A DAY
Refills: 0 | Status: ACTIVE | COMMUNITY
Start: 2019-10-31

## 2021-03-03 RX ORDER — ASPIRIN 81 MG
TABLET, DELAYED RELEASE (ENTERIC COATED) ORAL
Qty: 30 | Refills: 0 | Status: ACTIVE | COMMUNITY
Start: 2014-02-14

## 2021-03-03 RX ORDER — FAMOTIDINE 20 MG/1
TABLET ORAL
Qty: 60 | Refills: 0 | Status: DISCONTINUED | COMMUNITY
Start: 2014-02-14

## 2021-03-03 RX ORDER — MELOXICAM 7.5 MG/1
TABLET ORAL
Qty: 60 | Refills: 0 | Status: DISCONTINUED | COMMUNITY
Start: 2018-09-05

## 2021-03-03 RX ORDER — TOBRAMYCIN AND DEXAMETHASONE 3; 1 MG/G; MG/G
OINTMENT OPHTHALMIC
Qty: 3 | Refills: 0 | Status: DISCONTINUED | COMMUNITY
Start: 2019-12-03

## 2021-03-03 RX ORDER — METRONIDAZOLE 500 MG/1
TABLET ORAL
Qty: 30 | Refills: 0 | Status: DISCONTINUED | COMMUNITY
Start: 2013-12-26

## 2021-03-03 RX ORDER — ERYTHROMYCIN 5 MG/G
OINTMENT OPHTHALMIC
Qty: 3 | Refills: 0 | Status: DISCONTINUED | COMMUNITY
Start: 2013-07-15

## 2021-03-03 RX ORDER — SERTRALINE HYDROCHLORIDE 112 UG/1
1 TABLET IN THE MORNING ON AN EMPTY STOMACH TABLET ORAL ONCE A DAY
Refills: 0 | Status: ACTIVE | COMMUNITY
Start: 2018-09-17

## 2021-03-03 RX ORDER — CLOBETASOL PROPIONATE 0.5 MG/G
AEROSOL, FOAM TOPICAL
Qty: 50 | Refills: 0 | Status: DISCONTINUED | COMMUNITY
Start: 2014-05-28

## 2021-03-03 RX ORDER — BROMPHENIRAMINE MALEATE, PSEUDOEPHEDRINE HYDROCHLORIDE, 2; 30; 10 MG/5ML; MG/5ML; MG/5ML
SYRUP ORAL
Qty: 120 | Refills: 0 | Status: DISCONTINUED | COMMUNITY
Start: 2014-12-30

## 2021-03-03 RX ORDER — PANTOPRAZOLE SODIUM 40 MG/1
1 TABLET TABLET, DELAYED RELEASE ORAL ONCE A DAY
Qty: 90 TABLET | Refills: 2 | Status: ACTIVE | COMMUNITY
Start: 2021-01-27

## 2021-03-03 RX ORDER — PANTOPRAZOLE SODIUM 40 MG/1
TABLET, DELAYED RELEASE ORAL
Qty: 30 | Refills: 0 | Status: DISCONTINUED | COMMUNITY
Start: 2013-12-26

## 2021-03-03 RX ORDER — LIDOCAINE 50 MG/G
PATCH TOPICAL
Qty: 30 | Refills: 0 | Status: DISCONTINUED | COMMUNITY
Start: 2018-09-11

## 2021-03-03 RX ORDER — AZITHROMYCIN DIHYDRATE 250 MG/1
TAKE 2 TABLETS BY MOUTH TODAY, THEN TAKE 1 TABLET DAILY FOR 4 DAYS TABLET, FILM COATED ORAL
Qty: 6 | Refills: 0 | Status: DISCONTINUED | COMMUNITY
Start: 2012-01-29

## 2021-03-03 NOTE — EXAM-AA
heis alert and oriented to person place and situation in no acute distress.  There is no scleral icterus.

## 2021-03-03 NOTE — HPI-TODAY'S VISIT:
70-year-old male with a history of acid reflux, presenting for follow-up of abdominal pain.  He was last seen in the office on 12/3/2020 with complaints of belching, bloating and GERD.  Belching and bloating was likely secondary to functional disorder.  Trazodone and Lunesta or stopped  and replaced with amitriptyline.  He was to trial FDgard.  If no improvement, an EGD was a consideration.  He continues with "attacks" of reflux, prompting multiple ED visits. Esomeprazole does not seem to be controlling symptoms. No dysphagia. He denies any heartburn. He mainly complains of a burning sensation in his stomach, with burning, belching and nausea feeling. No vomiting. He has increased bloating as well. No weight loss. No hematemesis or melena. He has bowel movements regularly with occasional constipation. Additionally, he reports new onset of a cough that is not dry but not overly productive. Cough is described as a nuiscance. He is not taking Amitriptyline because it made him feel weird. He resumed trazadone and Lunesta. 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  I have chronically followed the patient for constipation bloating and belching.  I originally started following him in 2009.  Colonoscopy as stated above in January 2020 showed diverticulosis and an enlarged prostate.  The last upper endoscopy prior to this most recent one was in March 2011.  This also revealed a hiatal hernia and an irregular Z-line as noted on this most recent endoscopy.  Biopsies on prior exam also revealed some intestinal metaplasia.  Right upper quadrant ultrasound in August 2019 was unremarkable.  CT abdomen and pelvis in 2014 with contrast revealed constipation.  CT abdomen and pelvis in April 2020 for hematuria revealed an enlarged prostate and hemangioma of the right hepatic lobe.  Recent cmp normal.  He states he is doing very well now with no symptomatology.  He is discontinued pantoprazole and he thinks this may have improved his symptoms.  In the distant past he had been on AcipHex with good results.

## 2021-03-31 ENCOUNTER — OFFICE VISIT (OUTPATIENT)
Dept: URBAN - METROPOLITAN AREA CLINIC 113 | Facility: CLINIC | Age: 71
End: 2021-03-31